# Patient Record
Sex: MALE | Race: WHITE | URBAN - METROPOLITAN AREA
[De-identification: names, ages, dates, MRNs, and addresses within clinical notes are randomized per-mention and may not be internally consistent; named-entity substitution may affect disease eponyms.]

---

## 2020-12-04 ENCOUNTER — TELEPHONE ENCOUNTER (OUTPATIENT)
Dept: URBAN - METROPOLITAN AREA CLINIC 113 | Facility: CLINIC | Age: 65
End: 2020-12-04

## 2020-12-04 VITALS — WEIGHT: 210 LBS | HEIGHT: 73 IN | BODY MASS INDEX: 27.83 KG/M2

## 2020-12-04 RX ORDER — SODIUM, POTASSIUM,MAG SULFATES 17.5-3.13G
354 ML SOLUTION, RECONSTITUTED, ORAL ORAL
Qty: 354 ML | Refills: 0 | OUTPATIENT
Start: 2020-12-04 | End: 2020-12-05

## 2021-01-15 ENCOUNTER — OFFICE VISIT (OUTPATIENT)
Dept: URBAN - METROPOLITAN AREA SURGERY CENTER 25 | Facility: SURGERY CENTER | Age: 66
End: 2021-01-15
Payer: COMMERCIAL

## 2021-01-15 DIAGNOSIS — Z86.010 H/O ADENOMATOUS POLYP OF COLON: ICD-10-CM

## 2021-01-15 PROCEDURE — G0105 COLORECTAL SCRN; HI RISK IND: HCPCS | Performed by: INTERNAL MEDICINE

## 2021-01-15 PROCEDURE — G9936 PMH PLYP/NEO CO/RECT/JUN/ANS: HCPCS | Performed by: INTERNAL MEDICINE

## 2021-01-15 PROCEDURE — G8907 PT DOC NO EVENTS ON DISCHARG: HCPCS | Performed by: INTERNAL MEDICINE

## 2021-03-01 ENCOUNTER — LAB OUTSIDE AN ENCOUNTER (OUTPATIENT)
Dept: URBAN - METROPOLITAN AREA CLINIC 113 | Facility: CLINIC | Age: 66
End: 2021-03-01

## 2021-03-01 ENCOUNTER — OFFICE VISIT (OUTPATIENT)
Dept: URBAN - METROPOLITAN AREA CLINIC 113 | Facility: CLINIC | Age: 66
End: 2021-03-01
Payer: COMMERCIAL

## 2021-03-01 ENCOUNTER — WEB ENCOUNTER (OUTPATIENT)
Dept: URBAN - METROPOLITAN AREA CLINIC 113 | Facility: CLINIC | Age: 66
End: 2021-03-01

## 2021-03-01 VITALS
SYSTOLIC BLOOD PRESSURE: 121 MMHG | HEART RATE: 60 BPM | DIASTOLIC BLOOD PRESSURE: 84 MMHG | RESPIRATION RATE: 20 BRPM | BODY MASS INDEX: 28.76 KG/M2 | HEIGHT: 73 IN | WEIGHT: 217 LBS | TEMPERATURE: 98.6 F

## 2021-03-01 DIAGNOSIS — Z86.010 H/O ADENOMATOUS POLYP OF COLON: ICD-10-CM

## 2021-03-01 DIAGNOSIS — K59.09 CHRONIC CONSTIPATION: ICD-10-CM

## 2021-03-01 DIAGNOSIS — K21.9 GASTROESOPHAGEAL REFLUX DISEASE, UNSPECIFIED WHETHER ESOPHAGITIS PRESENT: ICD-10-CM

## 2021-03-01 PROBLEM — 428283002 HISTORY OF POLYP OF COLON: Status: ACTIVE | Noted: 2020-12-04

## 2021-03-01 PROCEDURE — 99214 OFFICE O/P EST MOD 30 MIN: CPT | Performed by: NURSE PRACTITIONER

## 2021-03-01 RX ORDER — NAPROXEN SODIUM 220 MG/1
1 TABLET WITH FOOD OR MILK AS NEEDED TABLET ORAL
Status: ACTIVE | COMMUNITY

## 2021-03-01 NOTE — HPI-TODAY'S VISIT:
This is a 65-year-old male presenting for follow-up after a surveillance colonoscopy.  Due to a history of colon polyps, he had a surveillance colonoscopy 1/15/2021 notable for BB PS 9, nonbleeding grade 2 internal hemorrhoids.  Due to a prior history of polyps, surveillance recommended in January 2026. He reports a longstanding history of acid reflux.  He was on Nexium in the past.  He had side effects to the medication and he was concerned regarding "a risk of cancer with long-term use."  He is having heartburn and regurgitation at least 5 days a week, usually in the evening.  He uses honey to alleviate symptoms.  He had an EGD more than a decade ago that showed "something in my esophagus."  He does not recall a history of Horvath's.  He admits a history of a hiatal hernia and a prior duodenal ulcer.  He infrequently takes Aleve for back pain.  He also reports a history of constipation.  He has bowel movements 2 or 3 times a day.  He took MiraLAX in the past and reports it was ineffective at first efficacy waned.  He has been using Metamucil.  He reports his bowel movements are softer and there is less straining.  He has occasional bloating.  He denies abdominal pain or other abdominal symptoms.

## 2021-03-10 ENCOUNTER — OFFICE VISIT (OUTPATIENT)
Dept: URBAN - METROPOLITAN AREA SURGERY CENTER 25 | Facility: SURGERY CENTER | Age: 66
End: 2021-03-10
Payer: COMMERCIAL

## 2021-03-10 ENCOUNTER — CLAIMS CREATED FROM THE CLAIM WINDOW (OUTPATIENT)
Dept: URBAN - METROPOLITAN AREA CLINIC 4 | Facility: CLINIC | Age: 66
End: 2021-03-10
Payer: COMMERCIAL

## 2021-03-10 DIAGNOSIS — K21.00 GASTRO-ESOPHAGEAL REFLUX DISEASE WITH ESOPHAGITIS, WITHOUT BLEEDING: ICD-10-CM

## 2021-03-10 DIAGNOSIS — K31.89 ACQUIRED DEFORMITY OF DUODENUM: ICD-10-CM

## 2021-03-10 DIAGNOSIS — K21.9 ACID REFLUX: ICD-10-CM

## 2021-03-10 DIAGNOSIS — K29.60 OTHER GASTRITIS WITHOUT BLEEDING: ICD-10-CM

## 2021-03-10 PROCEDURE — 88305 TISSUE EXAM BY PATHOLOGIST: CPT | Performed by: PATHOLOGY

## 2021-03-10 PROCEDURE — 88312 SPECIAL STAINS GROUP 1: CPT | Performed by: PATHOLOGY

## 2021-03-10 PROCEDURE — 43239 EGD BIOPSY SINGLE/MULTIPLE: CPT | Performed by: INTERNAL MEDICINE

## 2021-03-10 PROCEDURE — G8907 PT DOC NO EVENTS ON DISCHARG: HCPCS | Performed by: INTERNAL MEDICINE

## 2021-03-10 RX ORDER — NAPROXEN SODIUM 220 MG/1
1 TABLET WITH FOOD OR MILK AS NEEDED TABLET ORAL
Status: ACTIVE | COMMUNITY

## 2021-05-11 ENCOUNTER — WEB ENCOUNTER (OUTPATIENT)
Dept: URBAN - METROPOLITAN AREA CLINIC 113 | Facility: CLINIC | Age: 66
End: 2021-05-11

## 2021-05-11 ENCOUNTER — OFFICE VISIT (OUTPATIENT)
Dept: URBAN - METROPOLITAN AREA CLINIC 113 | Facility: CLINIC | Age: 66
End: 2021-05-11
Payer: COMMERCIAL

## 2021-05-11 VITALS
TEMPERATURE: 98.5 F | RESPIRATION RATE: 18 BRPM | BODY MASS INDEX: 28.23 KG/M2 | SYSTOLIC BLOOD PRESSURE: 148 MMHG | DIASTOLIC BLOOD PRESSURE: 93 MMHG | WEIGHT: 213 LBS | HEART RATE: 50 BPM | HEIGHT: 73 IN

## 2021-05-11 DIAGNOSIS — K21.9 GASTROESOPHAGEAL REFLUX DISEASE WITHOUT ESOPHAGITIS: ICD-10-CM

## 2021-05-11 PROBLEM — 266435005: Status: ACTIVE | Noted: 2021-05-11

## 2021-05-11 PROCEDURE — 99213 OFFICE O/P EST LOW 20 MIN: CPT | Performed by: INTERNAL MEDICINE

## 2021-05-11 RX ORDER — ALPRAZOLAM 0.5 MG/1
1 TABLET TABLET ORAL TWICE A DAY
Status: ACTIVE | COMMUNITY

## 2021-05-11 RX ORDER — FAMOTIDINE 40 MG/1
1 TABLET TABLET, FILM COATED ORAL TWICE A DAY
Qty: 60 TABLET | Refills: 5 | OUTPATIENT

## 2021-05-11 RX ORDER — NAPROXEN SODIUM 220 MG/1
1 TABLET WITH FOOD OR MILK AS NEEDED TABLET ORAL
Status: DISCONTINUED | COMMUNITY

## 2021-05-11 NOTE — HPI-TODAY'S VISIT:
This is a 65-year-old male with a history of adenomatous colon polyps due surveillance in 2026, GERD, and chronic constipation presenting for follow-up after an EGD. He was last seen 3/1/2021.  He reported a longstanding history of acid reflux and reported an esophageal abnormality identified on prior EGD more than 10 years before his visit.  He was having heartburn 5 days a week relieved with taking honey.  Restarting daily PPI was discussed.  Due to potential side effects, he was reluctant to start medication.  He reported constipation and had benefited from fiber.  He had used MiraLAX in the past but efficacy had waned.  He was instructed to take fiber on a daily basis and restart daily MiraLAX which he was to increase to twice a day if needed. He is having breakthrough heartburn 2 or 3 times a week for which he is taking Lakesha-Richmond chews.  He  also reports intermittent nocturnal symptoms which are relieved with taking a spoonful of honey.  Overall, he reports worsening symptoms over the last 3 to 5 years.  He denies dysphagia, abdominal pain, nausea, or any other abdominal symptoms.  He was on Nexium in the past and was well controlled.  However, he became concerned regarding possible long-term side effects and discontinued the medication.

## 2021-05-11 NOTE — HPI-OTHER HISTORIES
EGD 3/10/2021:Irregular Z-line at the GE junction status post biopsy, gastritis status post biopsy, normal examined duodenum.  Stomach antral and body biopsy demonstrated chemical/reactive gastropathy without H. pylori or intestinal metaplasia.  Lower third esophageal biopsies demonstrated squamocolumnar mucosa with reflux type changes and no evidence of Horvath's esophagus.

## 2021-09-13 ENCOUNTER — OFFICE VISIT (OUTPATIENT)
Dept: URBAN - METROPOLITAN AREA CLINIC 113 | Facility: CLINIC | Age: 66
End: 2021-09-13
Payer: COMMERCIAL

## 2021-09-13 ENCOUNTER — DASHBOARD ENCOUNTERS (OUTPATIENT)
Age: 66
End: 2021-09-13

## 2021-09-13 VITALS
TEMPERATURE: 98.2 F | RESPIRATION RATE: 18 BRPM | BODY MASS INDEX: 28.23 KG/M2 | SYSTOLIC BLOOD PRESSURE: 146 MMHG | HEART RATE: 54 BPM | WEIGHT: 213 LBS | HEIGHT: 73 IN | DIASTOLIC BLOOD PRESSURE: 86 MMHG

## 2021-09-13 DIAGNOSIS — K21.9 GASTROESOPHAGEAL REFLUX DISEASE, UNSPECIFIED WHETHER ESOPHAGITIS PRESENT: ICD-10-CM

## 2021-09-13 DIAGNOSIS — K59.09 CHRONIC CONSTIPATION: ICD-10-CM

## 2021-09-13 PROBLEM — 236069009: Status: ACTIVE | Noted: 2021-03-01

## 2021-09-13 PROBLEM — 235595009: Status: ACTIVE | Noted: 2021-03-01

## 2021-09-13 PROCEDURE — 99213 OFFICE O/P EST LOW 20 MIN: CPT | Performed by: INTERNAL MEDICINE

## 2021-09-13 RX ORDER — ALPRAZOLAM 0.5 MG/1
1 TABLET TABLET ORAL TWICE A DAY
Status: ACTIVE | COMMUNITY

## 2021-09-13 RX ORDER — FAMOTIDINE 40 MG/1
1 TABLET TABLET, FILM COATED ORAL TWICE A DAY
Qty: 60 TABLET | Refills: 5 | Status: ACTIVE | COMMUNITY

## 2021-09-13 NOTE — HPI-TODAY'S VISIT:
This is a 66-year-old male with a history of adenomatous colon polyps due surveillance in 2026, GERD, and chronic constipation presenting for follow-up. He was last seen 5/11/2021 following EGD notable for an irregular Z-line with biopsies demonstrating reflux type changes.  He also had gastritis with biopsies demonstrating chemical/reactive gastropathy.  He was taking Lakesha-Tullos chewables 2-3 times a week and also reported intermittent nocturnal symptoms which were managed with a spoonful of honey.  His symptoms had worsened over 3 to 5 years.  He was well controlled on Nexium in the past but discontinued it because of concern for long-term side effects.  He was prescribed famotidine 40 mg twice a day and instructed to observe lifestyle modification for GERD. He is taking famotidine as needed and requires it 2 or 3 times a month.  He typically uses it at bedtime.  He is otherwise asymptomatic and has not required an acids.  He denies dysphagia.  He reports recurrence of constipation a few months ago.  He began taking a daily probiotic and is now having a daily bowel movement or a bowel movement every other day.  He has occasional exacerbations associated with diet.  He reports occasional bloating and pain in the left side of his abdomen that is relieved with a bowel movement.  This is chronic and stable.  He denies any other abdominal symptoms.